# Patient Record
Sex: FEMALE | Race: WHITE
[De-identification: names, ages, dates, MRNs, and addresses within clinical notes are randomized per-mention and may not be internally consistent; named-entity substitution may affect disease eponyms.]

---

## 2021-01-29 ENCOUNTER — HOSPITAL ENCOUNTER (OUTPATIENT)
Dept: HOSPITAL 92 - LABBT | Age: 31
Discharge: HOME | End: 2021-01-29
Attending: OBSTETRICS & GYNECOLOGY
Payer: OTHER GOVERNMENT

## 2021-01-29 DIAGNOSIS — Z20.822: ICD-10-CM

## 2021-01-29 DIAGNOSIS — Z01.812: Primary | ICD-10-CM

## 2021-01-29 PROCEDURE — U0003 INFECTIOUS AGENT DETECTION BY NUCLEIC ACID (DNA OR RNA); SEVERE ACUTE RESPIRATORY SYNDROME CORONAVIRUS 2 (SARS-COV-2) (CORONAVIRUS DISEASE [COVID-19]), AMPLIFIED PROBE TECHNIQUE, MAKING USE OF HIGH THROUGHPUT TECHNOLOGIES AS DESCRIBED BY CMS-2020-01-R: HCPCS

## 2021-01-29 PROCEDURE — U0005 INFEC AGEN DETEC AMPLI PROBE: HCPCS

## 2021-01-29 PROCEDURE — 87635 SARS-COV-2 COVID-19 AMP PRB: CPT

## 2021-02-01 ENCOUNTER — HOSPITAL ENCOUNTER (INPATIENT)
Dept: HOSPITAL 92 - L&D-LIB | Age: 31
LOS: 1 days | Discharge: HOME | End: 2021-02-02
Attending: OBSTETRICS & GYNECOLOGY | Admitting: OBSTETRICS & GYNECOLOGY
Payer: OTHER GOVERNMENT

## 2021-02-01 VITALS — BODY MASS INDEX: 38.7 KG/M2

## 2021-02-01 DIAGNOSIS — O34.211: Primary | ICD-10-CM

## 2021-02-01 DIAGNOSIS — Z20.822: ICD-10-CM

## 2021-02-01 DIAGNOSIS — Z3A.39: ICD-10-CM

## 2021-02-01 LAB
HBSAG INDEX: 0.17 S/CO (ref 0–0.99)
HGB BLD-MCNC: 11.8 G/DL (ref 12–16)
MCH RBC QN AUTO: 30.9 PG (ref 27–31)
MCV RBC AUTO: 92.7 FL (ref 78–98)
PLATELET # BLD AUTO: 249 THOU/UL (ref 130–400)
RBC # BLD AUTO: 3.81 MILL/UL (ref 4.2–5.4)
SYPHILIS ANTIBODY INDEX: 0.05 S/CO
WBC # BLD AUTO: 10.7 THOU/UL (ref 4.8–10.8)

## 2021-02-01 PROCEDURE — 36415 COLL VENOUS BLD VENIPUNCTURE: CPT

## 2021-02-01 PROCEDURE — 86900 BLOOD TYPING SEROLOGIC ABO: CPT

## 2021-02-01 PROCEDURE — 51702 INSERT TEMP BLADDER CATH: CPT

## 2021-02-01 PROCEDURE — 86901 BLOOD TYPING SEROLOGIC RH(D): CPT

## 2021-02-01 PROCEDURE — 86850 RBC ANTIBODY SCREEN: CPT

## 2021-02-01 PROCEDURE — 87340 HEPATITIS B SURFACE AG IA: CPT

## 2021-02-01 PROCEDURE — 85027 COMPLETE CBC AUTOMATED: CPT

## 2021-02-01 PROCEDURE — 86780 TREPONEMA PALLIDUM: CPT

## 2021-02-01 PROCEDURE — S0028 INJECTION, FAMOTIDINE, 20 MG: HCPCS

## 2021-02-01 RX ADMIN — DOCUSATE CALCIUM SCH MG: 240 CAPSULE, LIQUID FILLED ORAL at 22:23

## 2021-02-01 NOTE — PDOC.LDHP
Labor and Delivery H&P


Chief complaint: scheduled  section


HPI: 





Here for scheduled RCS, previous 1CS for suspected macrosomia. 


Current gestational age (weeks): 39


Due date: 21


Dating criteria: last menstrual period, first trimester ultrasound


Grav: 39


Para: 2


OB History Details: 





1


Current pregnancy complications: none


Abnormal US findings: No


Current medications: pre-vipul vitamins


Previous surgical history: low tranverse CS


Allergies/Adverse Reactions: 


                                    Allergies











Allergy/AdvReac Type Severity Reaction Status Date / Time


 


No Known Allergies Allergy   Unverified 21 10:51











Social history: none





- Physical Exam


Vital signs reviewed and normal: yes


General: resting


Lungs: CTAB


Abdomen: gravid


Extremeties: no edema


FHT: category 1





- OB Labs


Blood type: A


RH: positive


Antibody Screen: negative


HIV: negative


RPR: negative


HEPSAg: negative


1 hour GCT: negative


GBS: negative


Urine drug screen: negative


Rubella: immune





- Assessment


L&D Assessment: scheduled repeat  section





- Plan


Plan: admit to L&D, informed consent obtained, anesthesia consult for pain 

management


-: 


TO OR FOR SCHEDULED RCS.

## 2021-02-01 NOTE — PDOC.OPDEL
OB Operative/Delivery Note


Delivery Dr/Surgeon: Lei 


Assist: Chang


Pre-Delivery Diagnosis: scheduled  section (RCS)


Procedure/Post Delivery Dx: repeat low transverse CS


Weeks gestation: 39


Anesthesia: spinal





- Findings


  ** A


Sex: female





- Additional Findings/Plan


Placenta delivered: spontaneous


 findings: low transverse hysterotomy without extension


Estimated blood loss: 500ml


Compilations/Other Findings: 





persistent breech in female until 38 weeks


Post delivery plan: routine recovery

## 2021-02-01 NOTE — OP
DATE OF PROCEDURE:  2021



PREOPERATIVE DIAGNOSES:  

1. Desires repeat  section.

2. A 39 weeks.



ASSISTANT:  Kaila Downing MD, Family Medicine Resident.



ANESTHESIA:  Spinal per Dr. Lopez.



COMPLICATIONS:  None.



POSTOPERATIVE DIAGNOSES:  

1. Desires repeat  section.

2. A 39 weeks.



PROCEDURE PERFORMED:  Repeat low-transverse  section.



ESTIMATED BLOOD LOSS:  500 mL.



QUANTITATIVE BLOOD LOSS:  Pending at the time of dictation.



OPERATIVE FINDINGS:  

1. Low-transverse hysterotomy without extension.

2. Thin lower uterine segment.

3. Vigorous female infant, Apgars and weight pending at the time of dictation,

delivered from vertex presentation; however, known to be in persistent breech until

38 weeks. 

4. Normal-appearing uterus, tubes, and ovaries bilaterally.

5. Fundus firm after delivery of placenta.

6. Surgical site hemostatic.



PROCEDURE IN DETAIL:  The patient was taken back to the OR with IV fluids running.

Once she was in the OR, spinal anesthesia was obtained.  The patient placed in

dorsal supine position with left lateral tilt.  Hicks catheter was used, was placed

using sterile technique and the abdomen was prepped and draped in normal fashion for

 section.  Time-out was performed.  2 g of Ancef was infused prior to the

start of the procedure.  Anesthesia was tested after the patient was draped and

found to be adequate.  A Pfannenstiel skin incision was made with a scalpel.  Skin

incision was carried down through subcutaneous tissue to the fascia.  Once the

fascia was reached, it was incised in the midline and extended superolaterally using

curved Zhang scissors.  Kocher clamps were placed at the superior border of the

fascia, which was dissected off the rectus abdominis muscles.  In similar fashion,

Kocher clamps were placed at the inferior border of the fascia, which was dissected

down towards the level of pubic symphysis.  The rectus muscles were  in the

midline.  The rectus muscles and peritoneum were stretched and the Shaheen O

retractor was placed in the peritoneal cavity for retraction, visualization, and

protection of the wound.  Very thin uterine segment was noted.  A scalp was used to

dissect the bladder reflection and directed away from the planned hysterotomy site.

Low-transverse hysterotomy was made with a scalpel.  Clear fluid was noted.  The

hysterotomy was stretched using a Joy maneuver.  The infant was delivered through

the incision from vertex presentation without difficulty.  Nose and mouth were

suctioned.  The cord was doubly clamped and cut.  The infant was handed off to

special care nurse.  Cord blood was collected.  The placenta was delivered.  The

uterus was exteriorized, massaged firm, and cleared of clot and debris.  Uterus

returned to the abdominal cavity.  Hysterotomy was inspected with no extension

noted.  The hysterotomy was then closed in a running locked fashion using Monocryl

suture.  Second layer closure was used with Monocryl imbricating the hysterotomy

incision.  After the hysterotomy was closed in 2 layers, it was copiously irrigated

and dry.  It was inspected with no areas of bleeding noted.  The uterine fundus was

noted to be firm.  The Shaheen O retractor was removed from the abdominal cavity.

The rectus fascia and paracolic gutters were irrigated and suctioned dry.  The

rectus fascia and muscles were inspected and no areas of bleeding were noted.  The

rectus fascia was reapproximated from corner to corner and tied together using PDS

suture.  After the fascia was reapproximated, subcutaneous tissue was irrigated and

dried.  Any small areas of bleeding were controlled with cauterization.  Plain gut

suture was used to reapproximate the subcutaneous tissue.  Skin was closed with 4-0

Monocryl and dressed with Dermabond.  The procedure was uncomplicated.  The counts

were correct. 







Job ID:  [id number]

## 2021-02-02 VITALS — DIASTOLIC BLOOD PRESSURE: 82 MMHG | SYSTOLIC BLOOD PRESSURE: 153 MMHG | TEMPERATURE: 97.9 F

## 2021-02-02 LAB
HGB BLD-MCNC: 10.2 G/DL (ref 12–16)
MCH RBC QN AUTO: 31.6 PG (ref 27–31)
MCV RBC AUTO: 93.5 FL (ref 78–98)
PLATELET # BLD AUTO: 201 THOU/UL (ref 130–400)
RBC # BLD AUTO: 3.23 MILL/UL (ref 4.2–5.4)
WBC # BLD AUTO: 12.1 THOU/UL (ref 4.8–10.8)

## 2021-02-02 RX ADMIN — DOCUSATE CALCIUM SCH MG: 240 CAPSULE, LIQUID FILLED ORAL at 10:02

## 2021-02-02 NOTE — PDOC.PP
Post Partum Progress Note


Post Partum Day #: 1


Subjective: 





doing well, tolerating regular diet, minimal pain


PO intake tolerated: yes


Flatus: yes


Ambulation: yes


                             Vital Signs (12 hours)











  Temp Pulse Resp BP Pulse Ox


 


 21 12:00  97.9 F  82  18  153/82 H 


 


 21 08:00  97.8 F  93  16  131/90  100


 


 21 05:40  98.3 F  90  18  116/65 








                                     Weight











Weight                         212 lb

















- Physical Examination


General: NAD


Respiratory: non-labored breathing


Abdominal: no distention


Skin: CS incision dry & intact


Psychiatric: A&Ox3, normal affect


Result Diagrams: 


                                 21 04:55





Additional Labs: 


                                Post Partum Labs











Hep Bs Antigen  Non-Reactive S/CO (NonReactive)   21  11:13    


 


Blood Type  A POSITIVE   21  11:53    











(1) Status post repeat low transverse  section


Code(s): Z98.891 - HISTORY OF UTERINE SCAR FROM PREVIOUS SURGERY   Status: Acute

  





(2) 39 weeks gestation of pregnancy


Code(s): Z3A.39 - 39 WEEKS GESTATION OF PREGNANCY   Status: Acute   





- Assessment/Plan


POD1 doing well, minimal pain, tolerating diet and voiding.  Will FU, if normal 

VS and pain well controlled, possible DC home this evening if baby DC.

## 2024-12-06 ENCOUNTER — HOSPITAL ENCOUNTER (EMERGENCY)
Dept: HOSPITAL 92 - CSHERS | Age: 34
Discharge: HOME | End: 2024-12-06
Payer: OTHER GOVERNMENT

## 2024-12-06 DIAGNOSIS — N83.201: Primary | ICD-10-CM

## 2024-12-06 LAB
BACTERIA UR QL AUTO: (no result) HPF
CAUTI INDICATIONS FOR CULTURE: (no result)
PREGS CONTROL BACKGROUND?: (no result)
PREGS CONTROL BAR APPEAR?: YES
PREGU CONTROL BACKGROUND?: (no result)
PREGU CONTROL BAR APPEAR?: YES
PROT UR STRIP.AUTO-MCNC: 15 MG/DL
RBC UR QL AUTO: (no result) HPF (ref 0–3)
SP GR UR STRIP: 1.01 (ref 1–1.03)

## 2024-12-06 PROCEDURE — 96374 THER/PROPH/DIAG INJ IV PUSH: CPT

## 2024-12-06 PROCEDURE — 84703 CHORIONIC GONADOTROPIN ASSAY: CPT

## 2024-12-06 PROCEDURE — 96375 TX/PRO/DX INJ NEW DRUG ADDON: CPT

## 2024-12-06 PROCEDURE — 81001 URINALYSIS AUTO W/SCOPE: CPT

## 2024-12-06 PROCEDURE — 76856 US EXAM PELVIC COMPLETE: CPT

## 2024-12-06 PROCEDURE — 81025 URINE PREGNANCY TEST: CPT

## 2024-12-06 PROCEDURE — 96372 THER/PROPH/DIAG INJ SC/IM: CPT
